# Patient Record
Sex: MALE | Race: BLACK OR AFRICAN AMERICAN | NOT HISPANIC OR LATINO | URBAN - METROPOLITAN AREA
[De-identification: names, ages, dates, MRNs, and addresses within clinical notes are randomized per-mention and may not be internally consistent; named-entity substitution may affect disease eponyms.]

---

## 2023-06-21 ENCOUNTER — EMERGENCY (EMERGENCY)
Facility: HOSPITAL | Age: 48
LOS: 1 days | Discharge: HOME | End: 2023-06-21
Attending: STUDENT IN AN ORGANIZED HEALTH CARE EDUCATION/TRAINING PROGRAM | Admitting: STUDENT IN AN ORGANIZED HEALTH CARE EDUCATION/TRAINING PROGRAM
Payer: MEDICAID

## 2023-06-21 VITALS
DIASTOLIC BLOOD PRESSURE: 103 MMHG | RESPIRATION RATE: 18 BRPM | SYSTOLIC BLOOD PRESSURE: 158 MMHG | TEMPERATURE: 98 F | OXYGEN SATURATION: 100 % | HEART RATE: 98 BPM

## 2023-06-21 PROCEDURE — 99284 EMERGENCY DEPT VISIT MOD MDM: CPT

## 2023-06-21 RX ORDER — KETOROLAC TROMETHAMINE 30 MG/ML
15 SYRINGE (ML) INJECTION ONCE
Refills: 0 | Status: DISCONTINUED | OUTPATIENT
Start: 2023-06-21 | End: 2023-06-21

## 2023-06-21 RX ORDER — DIAZEPAM 5 MG
5 TABLET ORAL ONCE
Refills: 0 | Status: DISCONTINUED | OUTPATIENT
Start: 2023-06-21 | End: 2023-06-21

## 2023-06-21 RX ORDER — LIDOCAINE 4 G/100G
1 CREAM TOPICAL ONCE
Refills: 0 | Status: COMPLETED | OUTPATIENT
Start: 2023-06-21 | End: 2023-06-21

## 2023-06-21 RX ADMIN — Medication 15 MILLIGRAM(S): at 17:29

## 2023-06-21 RX ADMIN — LIDOCAINE 1 PATCH: 4 CREAM TOPICAL at 17:29

## 2023-06-21 RX ADMIN — Medication 5 MILLIGRAM(S): at 17:29

## 2023-06-21 NOTE — ED PROVIDER NOTE - ATTENDING CONTRIBUTION TO CARE
Bismark Henry DO:  patient seen and evaluated with the Fellow I was present for key portions of the History & Physical, and I agree with the Impression & Plan. 47 YOM, no reported PMH, PW neck pain.  Patient reports similar symptoms, started after working out.  Reports right-sided neck pain, not rating, worse with movement, feels stiff.  Denies N/V, CP, SOB, F/C, trauma, neck manipulation, recent chiropractor visit, accidents.  Denies history of similar symptoms.  Denies paresthesias.  Denies weakness.  Went to urgent care prior and had Flexeril without relief.  Patient arrives HDS, well-appearing, neurovascular intact.  Radial pulse 2+ bilateral.  Do not suspect meningitis, subarachnoid.  Likely muscle sprain/strain.  muscle hypertonic trapezius and right-sided SCM, difficulty ranging fully.  Patient can bend neck however.  No recent infections.  no indication for blood or imaging at this time.  Patient comfortable with plan.  Symptom control, DC with strict return precautions.

## 2023-06-21 NOTE — ED PROVIDER NOTE - PHYSICAL EXAMINATION
Gen: Well appearing in NAD   Head: NC/AT  Neck: trachea midline  Spine: no midline ttp of C spine, +ttp R paraspinal C spine and R trapezius muscle.   Resp:  No distress  Ext: no deformities  Neuro:  A&O appears non focal  Skin:  Warm and dry as visualized  Psych:  Normal affect and mood

## 2023-06-21 NOTE — ED PROVIDER NOTE - PATIENT PORTAL LINK FT
You can access the FollowMyHealth Patient Portal offered by [facility] by registering at the following website: [email or web address]. By joining [employer]’s FollowMyHealth portal, you will also be able to view your health information using other applications (apps) compatible with our system.

## 2023-06-21 NOTE — ED PROVIDER NOTE - NSFOLLOWUPINSTRUCTIONS_ED_ALL_ED_FT
** You have neck sprain.   ** Take over the counter Tylenol or Ibuprofen for pain -- follow dosing directions on original bottle. Ice and Salonpas Patches (over the counter) may also help with your pain. Make sure you do gentle exercises.   ** Follow up with your primary care doctor in the next 72 hours.     ** Go to the nearest Emergency Department if you experience any new or concerning symptoms, such as:   - worsening pain  - difficulty breathing  - passing out  - severe headache  - fever, chills

## 2023-06-21 NOTE — ED PROVIDER NOTE - CLINICAL SUMMARY MEDICAL DECISION MAKING FREE TEXT BOX
47-year-old male history of hypertension, presents with left lateral neck pain after heavy lifting.  On Friday patient was lifting heavy boxes then noted to have neck pain.  Patient was feeling better by Sunday so again engaged in some lifting.  Patient seen and urgent care was prescribed cyclobenzaprine without relief.  Patient is not really using heat or ice or taking acetaminophen or ibuprofen at home.  Patient denies fevers chills, no midline tenderness no headache, no change in mental status per wife at bedside. On exam, right paraspinal cervical spine tenderness radiating to trapezius.  Range of motion of neck severely limited by pain.  No other focal neurological deficits.    Differential includes, but not limited to: Neck sprain, torticollis.  Minimal suspicion for meningitis given normal vitals, no fever, no headache, very well-appearing exam, and onset after lifting heavy boxes.  No suspicion for C-spine injury given no midline tenderness, no upper extremity weakness, and reproducible tenderness at paraspinal C-spine location.  Analgesia, outpatient follow-up with PMD.

## 2023-06-21 NOTE — ED ADULT NURSE NOTE - OBJECTIVE STATEMENT
pt aox4, ambulatory comes in for pain to right neck/trapezius pain after lifting something heavy last thursday worsening today. limited movement of head/neck due to pain, pt appears uncomfortable. denies pareasthesias. able to move arms. no headahce or dizziness.

## 2023-06-21 NOTE — ED PROVIDER NOTE - PROGRESS NOTE DETAILS
Nori Montanez M.D. [employer] Fellow   Pt's wife at bedside, will be driving pt home. Agreeable to outpt follow up with PMD>

## 2023-06-21 NOTE — ED PROVIDER NOTE - OBJECTIVE STATEMENT
47-year-old male history of hypertension, presents with left lateral neck pain after heavy lifting.  On Friday patient was lifting heavy boxes then noted to have neck pain.  Patient was feeling better by Sunday so again engaged in some lifting.  Patient seen and urgent care was prescribed cyclobenzaprine without relief.  Patient is not really using heat or ice or taking acetaminophen or ibuprofen at home.  Patient denies fevers chills, no midline tenderness no headache, no change in mental status per wife at bedside.

## 2023-12-16 ENCOUNTER — EMERGENCY (EMERGENCY)
Facility: HOSPITAL | Age: 48
LOS: 1 days | Discharge: HOME | End: 2023-12-16
Attending: EMERGENCY MEDICINE | Admitting: EMERGENCY MEDICINE
Payer: SELF-PAY

## 2023-12-16 VITALS
HEART RATE: 95 BPM | SYSTOLIC BLOOD PRESSURE: 190 MMHG | RESPIRATION RATE: 18 BRPM | OXYGEN SATURATION: 100 % | TEMPERATURE: 98 F | DIASTOLIC BLOOD PRESSURE: 103 MMHG

## 2023-12-16 PROCEDURE — 99284 EMERGENCY DEPT VISIT MOD MDM: CPT

## 2023-12-16 PROCEDURE — 73564 X-RAY EXAM KNEE 4 OR MORE: CPT | Mod: 26,RT

## 2023-12-16 PROCEDURE — 73610 X-RAY EXAM OF ANKLE: CPT | Mod: 26,RT

## 2023-12-16 PROCEDURE — 99053 MED SERV 10PM-8AM 24 HR FAC: CPT

## 2023-12-16 PROCEDURE — 93971 EXTREMITY STUDY: CPT | Mod: 26,LT

## 2023-12-16 RX ORDER — IBUPROFEN 200 MG
30 TABLET ORAL
Qty: 630 | Refills: 0
Start: 2023-12-16 | End: 2023-12-22

## 2023-12-16 RX ORDER — DIPHENHYDRAMINE HCL 50 MG
2 CAPSULE ORAL
Qty: 10 | Refills: 0
Start: 2023-12-16 | End: 2023-12-20

## 2023-12-16 NOTE — ED PROVIDER NOTE - NSFOLLOWUPINSTRUCTIONS_ED_ALL_ED_FT
[facility]  Nephrology  [address] [place] [address]  Phone: [phone]    Edema    WHAT YOU NEED TO KNOW:  Edema is swelling throughout your body. Edema is usually a sign that you are retaining fluid. The swelling may be caused by heart failure or kidney, thyroid, or liver disease. It may also be caused by medicines such as antidepressants, blood pressure medicines, or hormones. Sudden swelling around the lips or face may be a sign of a severe allergic reaction. Swelling of an arm or leg may be caused by blockage of your veins.    DISCHARGE INSTRUCTIONS:  Seek care immediately if:  You have shortness of breath at rest, especially when you lie down.  You cough up pink, foamy sputum.  You have chest pain.    Your heartbeat is fast or uneven.  Contact your healthcare provider if:  The swollen area feels cold and is pale or blue in color.  The swollen area feels warm, painful, and is red in color.  You have increased swelling or swelling in other parts of your body.  You have questions or concerns about your condition or care.  Medicines:  Medicines help to get rid of extra body fluid.  Take your medicine as directed. Contact your healthcare provider if you think your medicine is not helping or if you have side effects. Tell him or her if you are allergic to any medicine. Keep a list of the medicines, vitamins, and herbs you take. Include the amounts, and when and why you take them. Bring the list or the pill bottles to follow-up visits. Carry your medicine list with you in case of an emergency.  Follow up with your healthcare provider as directed: Write down your questions so you remember to ask them during your visits.    Manage edema:  Elevate your arms or legs as directed. Raise them above the level of your heart as often as you can. This will help decrease swelling and pain. Prop them on pillows or blankets to keep them elevated comfortably.  Wear pressure stockings as directed. The stockings are tight and put pressure on your legs. This helps to keep fluid from collecting in your legs or ankles.  Limit your salt intake. Salt causes your body to hold water. Ask about any other changes to your diet.  Stay active. Do not stand or sit for long periods of time. Ask your healthcare provider about the best exercise plan for you.  Keep your skin moist using lotion, cream, or ointment. Ask your healthcare provider what to use and how often to use it. [facility]  Nephrology  [address] [place] [address]  Phone: [phone]    Edema    WHAT YOU NEED TO KNOW:  Edema is swelling throughout your body. Edema is usually a sign that you are retaining fluid. The swelling may be caused by heart failure or kidney, thyroid, or liver disease. It may also be caused by medicines such as antidepressants, blood pressure medicines, or hormones. Sudden swelling around the lips or face may be a sign of a severe allergic reaction. Swelling of an arm or leg may be caused by blockage of your veins.    DISCHARGE INSTRUCTIONS:  Seek care immediately if:  You have shortness of breath at rest, especially when you lie down.  You cough up pink, foamy sputum.  You have chest pain.    Your heartbeat is fast or uneven.  Contact your healthcare provider if:  The swollen area feels cold and is pale or blue in color.  The swollen area feels warm, painful, and is red in color.  You have increased swelling or swelling in other parts of your body.  You have questions or concerns about your condition or care.  Medicines:  Medicines help to get rid of extra body fluid.  Take your medicine as directed. Contact your healthcare provider if you think your medicine is not helping or if you have side effects. Tell him or her if you are allergic to any medicine. Keep a list of the medicines, vitamins, and herbs you take. Include the amounts, and when and why you take them. Bring the list or the pill bottles to follow-up visits. Carry your medicine list with you in case of an emergency.  Follow up with your healthcare provider as directed: Write down your questions so you remember to ask them during your visits.    Manage edema:  Elevate your arms or legs as directed. Raise them above the level of your heart as often as you can. This will help decrease swelling and pain. Prop them on pillows or blankets to keep them elevated comfortably.  Wear pressure stockings as directed. The stockings are tight and put pressure on your legs. This helps to keep fluid from collecting in your legs or ankles.  Limit your salt intake. Salt causes your body to hold water. Ask about any other changes to your diet.  Stay active. Do not stand or sit for long periods of time. Ask your healthcare provider about the best exercise plan for you.  Keep your skin moist using lotion, cream, or ointment. Ask your healthcare provider what to use and how often to use it.

## 2023-12-16 NOTE — ED PROVIDER NOTE - PATIENT PORTAL LINK FT
You can access the FollowMyHealth Patient Portal offered by [facility] by registering at the following website: [email or web address]. By joining [employer]’s FollowMyHealth portal, you will also be able to view your health information using other applications (apps) compatible with our system. You can access the FollowMyHealth Patient Portal offered by [facility] by registering at the following website: [email or web address]. By joining [employer]s FollowMyHealth portal, you will also be able to view your health information using other applications (apps) compatible with our system.

## 2023-12-16 NOTE — ED ADULT NURSE NOTE - OBJECTIVE STATEMENT
Pt received in intake HW. Pt 48Y M Aox4, ambulatory. Hx rt knee pain s/p injury with twisting it 1.5 weeks ago. Seen and evaluated by MD Xie, pending XR and US. Respirations even and unlabored, denies chest pain. instructed to call for assistance as needed.

## 2023-12-16 NOTE — ED PROVIDER NOTE - OBJECTIVE STATEMENT
48M with a past history of HTN who PTED after twisting right knee 1 -2 weeks ago. Knee swelling has resolved but is here today for new painless swelling that has developed around his right ankle became swollen. No fever redness tenderness no abd swelling sob orthopnea or exercise intolerance

## 2023-12-16 NOTE — ED PROVIDER NOTE - CLINICAL SUMMARY MEDICAL DECISION MAKING FREE TEXT BOX
48M with a past history of HTN who PTED after twisting right knee 1 -2 weeks ago. Knee swelling has resolved but is here today for new painless swelling that has developed around his right ankle became swollen. No fever redness tenderness no abd swelling sob orthopnea or exercise intolerance  HTN otherwise vss  PE as described  IMP unilateral limb swelling/ankle swelling less likely 2/2 systemic process; most likely partial obstruction from resolving left knee effusion; will check xrays dus and reassess probable d/c with followup

## 2023-12-16 NOTE — ED PROVIDER NOTE - MUSCULOSKELETAL, MLM
Spine appears normal, range of motion is not limited, no muscle or joint tenderness but swelling noted to right ankle minimally pitting edema rt knee with slight effusion

## 2023-12-16 NOTE — ED PROVIDER NOTE - PROVIDER TOKENS
PROVIDER:[TOKEN:[[id number]],FOLLOWUP:[4-6 Days]] PROVIDER:[TOKEN:[[id number]],FOLLOWUP:[4-6 Days]]

## 2023-12-16 NOTE — ED ADULT TRIAGE NOTE - CHIEF COMPLAINT QUOTE
right ankle swelling    states twisted right knee 1 - 1 1/2 weeks ago.  right knee was initially swollen but as that resolved, right ankle became swollen.  denies pain.  bp- 190/103.  forgot to take his htn meds today.  past medical history- htn

## 2024-03-01 PROBLEM — Z00.00 ENCOUNTER FOR PREVENTIVE HEALTH EXAMINATION: Status: ACTIVE | Noted: 2024-03-01
